# Patient Record
Sex: MALE | Race: BLACK OR AFRICAN AMERICAN | NOT HISPANIC OR LATINO | Employment: UNEMPLOYED | URBAN - METROPOLITAN AREA
[De-identification: names, ages, dates, MRNs, and addresses within clinical notes are randomized per-mention and may not be internally consistent; named-entity substitution may affect disease eponyms.]

---

## 2022-10-10 ENCOUNTER — HOSPITAL ENCOUNTER (EMERGENCY)
Facility: HOSPITAL | Age: 42
Discharge: HOME | End: 2022-10-10
Attending: EMERGENCY MEDICINE
Payer: MEDICAID

## 2022-10-10 VITALS
HEIGHT: 71 IN | TEMPERATURE: 98 F | OXYGEN SATURATION: 100 % | DIASTOLIC BLOOD PRESSURE: 80 MMHG | RESPIRATION RATE: 14 BRPM | HEART RATE: 90 BPM | SYSTOLIC BLOOD PRESSURE: 130 MMHG | WEIGHT: 220 LBS | BODY MASS INDEX: 30.8 KG/M2

## 2022-10-10 DIAGNOSIS — M54.42 ACUTE LEFT-SIDED LOW BACK PAIN WITH LEFT-SIDED SCIATICA: Primary | ICD-10-CM

## 2022-10-10 PROCEDURE — 63600175 PHARM REV CODE 636 W HCPCS: Performed by: PHYSICIAN ASSISTANT

## 2022-10-10 PROCEDURE — 96372 THER/PROPH/DIAG INJ SC/IM: CPT | Performed by: PHYSICIAN ASSISTANT

## 2022-10-10 PROCEDURE — 99284 EMERGENCY DEPT VISIT MOD MDM: CPT | Mod: 25

## 2022-10-10 RX ORDER — KETOROLAC TROMETHAMINE 30 MG/ML
15 INJECTION, SOLUTION INTRAMUSCULAR; INTRAVENOUS
Status: COMPLETED | OUTPATIENT
Start: 2022-10-10 | End: 2022-10-10

## 2022-10-10 RX ORDER — DEXAMETHASONE SODIUM PHOSPHATE 4 MG/ML
12 INJECTION, SOLUTION INTRA-ARTICULAR; INTRALESIONAL; INTRAMUSCULAR; INTRAVENOUS; SOFT TISSUE
Status: COMPLETED | OUTPATIENT
Start: 2022-10-10 | End: 2022-10-10

## 2022-10-10 RX ORDER — METHOCARBAMOL 500 MG/1
1000 TABLET, FILM COATED ORAL 3 TIMES DAILY PRN
Qty: 20 TABLET | Refills: 0 | Status: SHIPPED | OUTPATIENT
Start: 2022-10-10 | End: 2022-10-15

## 2022-10-10 RX ADMIN — KETOROLAC TROMETHAMINE 15 MG: 30 INJECTION, SOLUTION INTRAMUSCULAR at 05:10

## 2022-10-10 RX ADMIN — DEXAMETHASONE SODIUM PHOSPHATE 12 MG: 4 INJECTION INTRA-ARTICULAR; INTRALESIONAL; INTRAMUSCULAR; INTRAVENOUS; SOFT TISSUE at 05:10

## 2022-10-10 NOTE — ED PROVIDER NOTES
Encounter Date: 10/10/2022       History     Chief Complaint   Patient presents with    Back Pain    Leg Pain     Pt reports intermittent mid back pain since Wednesday and this morning he reports he started having pain in his left leg when he got up to go the bathroom. Denies any recent injury. No loss of bowels or urine     40yo M with 5d hx L low back pain. Early this AM got up to use restroom, began with pain radiating down posterior thigh. Painful weight-bearing and ambulation. No leg weakness. No saddle anesthesia. No GI or  issues. No abdominal pain. No new rash. No trauma. No hx similar pain. Pain improved with immobility, with ice, with tigerbalm. Pain exacerbated with certain movements, with weight-bearing.     No significant pmh on file    Review of patient's allergies indicates:  No Known Allergies  No past medical history on file.  No past surgical history on file.  No family history on file.     Review of Systems   Constitutional:  Negative for fever.   Gastrointestinal:  Negative for abdominal pain, nausea and vomiting.   Genitourinary:         No bowel or bladder incontinence or retention   Musculoskeletal:  Positive for back pain. Negative for neck pain and neck stiffness.   Skin:  Negative for rash.   Neurological:  Negative for weakness and numbness.     Physical Exam     Initial Vitals [10/10/22 0438]   BP Pulse Resp Temp SpO2   (!) 143/88 90 17 98.4 °F (36.9 °C) 99 %      MAP       --         Physical Exam    Nursing note and vitals reviewed.  Constitutional: He appears well-developed and well-nourished. He is not diaphoretic. No distress.   HENT:   Head: Normocephalic and atraumatic.   Neck: Neck supple.   Normal range of motion.  Cardiovascular:  Intact distal pulses.           2+DP bilaterally   Musculoskeletal:      Cervical back: Normal range of motion and neck supple.      Comments: No midline spinal ttp. No paraspinal or gluteal muscular ttp. +L SLR at approx 20deg. Pain with passive L  hip adduction.      Neurological: He is alert and oriented to person, place, and time. GCS score is 15. GCS eye subscore is 4. GCS verbal subscore is 5. GCS motor subscore is 6.   Skin: Skin is warm. Capillary refill takes less than 2 seconds.   Psychiatric: He has a normal mood and affect. Thought content normal.       ED Course   Procedures  Labs Reviewed - No data to display       Imaging Results    None          Medications   dexamethasone injection 12 mg (has no administration in time range)   ketorolac injection 15 mg (has no administration in time range)     Medical Decision Making:   Differential Diagnosis:   Sprain/strain, arthritis, contusion, cauda equina, spinal stenosis  ED Management:  No GI/ issues, no bowel or bladder incontinence or retention. No saddle anesthesia. No hx spinal issues. No trauma. No paresthesia. Doubt cauda equina or cord compression. No fever, no recent infection. Doubt epidural abscess. No midline spinal ttp. No focal weakness. No ripping/tearing sensation. No hx AAA or aortic issues.  Neurovascularly intact distally. Suspect lumbar strain, now with some radicular involvement, possibly L4 L5 S1 dermatomes. Will treat with IM decadron and toradol, d/c with NSAIDs, muscle relaxers, have him return if any red flags. Return precautions given.                         Clinical Impression:   Final diagnoses:  [M54.42] Acute left-sided low back pain with left-sided sciatica (Primary)      ED Disposition Condition    Discharge Stable          ED Prescriptions       Medication Sig Dispense Start Date End Date Auth. Provider    methocarbamoL (ROBAXIN) 500 MG Tab Take 2 tablets (1,000 mg total) by mouth 3 (three) times daily as needed (stiffness/soreness). 20 tablet 10/10/2022 10/15/2022 Song Sigala PA-C          Follow-up Information       Follow up With Specialties Details Why Contact Info    [facility]  Schedule an appointment as soon as possible for a visit  To  establish primary care physician, for reevaluation [address] [place] [address]  [phone]      [facility]  Schedule an appointment as soon as possible for a visit  To establish primary care physician, For reevaluation [address] [facility] [address]  [phone]               Song Sigala PA-C  10/10/22 0541

## 2022-10-10 NOTE — ED TRIAGE NOTES
José Miguel Crystal, a 41 y.o. male presents to the ED w/ complaint of sciatic type back pain x 5 days.     Triage note:  Chief Complaint   Patient presents with    Back Pain    Leg Pain     Pt reports intermittent mid back pain since Wednesday and this morning he reports he started having pain in his left leg when he got up to go the bathroom. Denies any recent injury. No loss of bowels or urine     Review of patient's allergies indicates:  No Known Allergies  History reviewed. No pertinent past medical history.

## 2022-10-10 NOTE — DISCHARGE INSTRUCTIONS
Follow-up and establish care with a local primary care provider.    Ibuprofen Tylenol as needed for back pain.  Robaxin for stiffness/soreness.  Be aware, this medication is sedating.  Do not mix with alcohol or any other sedating medications.  Do not drive or operate machinery when taking this medication. Continue with heating pad or ice as needed for continued stiffness.    Return to this ED if worsening pain despite treatment, if you develop leg weakness, if any urinary or bladder issues, if symptoms worsen despite treatment, if any other problems occur.

## 2022-10-10 NOTE — ED NOTES
Injection of 12 mg dexamethasone given by Mary Swift. Patient instructed to remain in clinic for 20 minutes afterwards, and to report any adverse reaction to me immediately.